# Patient Record
Sex: FEMALE | Race: OTHER | URBAN - METROPOLITAN AREA
[De-identification: names, ages, dates, MRNs, and addresses within clinical notes are randomized per-mention and may not be internally consistent; named-entity substitution may affect disease eponyms.]

---

## 2022-03-11 ENCOUNTER — TELEMEDICINE (OUTPATIENT)
Dept: TELEHEALTH | Age: 32
End: 2022-03-11
Payer: MEDICAID

## 2022-03-11 DIAGNOSIS — J06.9 UPPER RESPIRATORY TRACT INFECTION, UNSPECIFIED TYPE: Primary | ICD-10-CM

## 2022-03-11 PROCEDURE — 99202 OFFICE O/P NEW SF 15 MIN: CPT | Performed by: PHYSICIAN ASSISTANT

## 2022-03-11 NOTE — PROGRESS NOTES
Virtual/Telephone Check-In    Jesus Henry verbally consents to a Virtual/Telephone Check-In service on 03/11/22. Patient has been referred to the [facility] website at [email or web address] to review the yearly Consent to Treat document. Patient understands and accepts financial responsibility for any deductible, co-insurance and/or co-pays associated with this service. Telehealth Verbal Consent   I conducted a telehealth visit with Jesus Henry today, 03/11/22, which was completed using two-way, real-time interactive audio and video communication. This has been done in good lynette to provide continuity of care in the best interest of the provider-patient relationship, due to the COVID -19 public health crisis/national emergency where restrictions of face-to-face office visits are ongoing. Every conscious effort was taken to allow for sufficient and adequate time to complete the visit. The patient was made aware of the limitations of the telehealth visit, including treatment limitations as no physical exam could be performed. The patient was advised to call 911 or to go to the ER in case there was an emergency. The patient was also advised of the potential privacy & security concerns related to the telehealth platform. The patient was made aware of where to find [facility] notice of privacy practices, telehealth consent form and other related consent forms and documents. which are located on the [facility] website. The patient verbally agreed to telehealth consent form, related consents and the risks discussed. Lastly, the patient confirmed that they were in [place]. Included in this visit, time may have been spent reviewing labs, medications, radiology tests and decision making. Appropriate medical decision-making and tests are ordered as detailed in the plan of care above. Coding/billing information is submitted for this visit based on complexity of care and/or time spent for the visit.     CHIEF COMPLAINT: Patient presents with:  Cold      HPI:   Deborah Chowdary is a 27year old female who presents for a video visit. Patient reports cough, nasal congestion for 2 days. Had sore throat initially but resolved  Patient denies headache, fever, chills, body aches, SOB, wheezing, chest pain. Patient has tried Tylenol, Advil and loratadine for symptoms, which has seemed to help. Patient has not been exposed to [employer]. Pt is vaccinated for covid. No current outpatient medications on file. No past medical history on file. No past surgical history on file. Social History    Tobacco Use      Smoking status: Not on file      Smokeless tobacco: Not on file    Alcohol use: Not on file    Drug use: Not on file        REVIEW OF SYSTEMS:   GENERAL: normal appetite  SKIN: no rashes or abnormal skin lesions  HEENT: See HPI  LUNGS: denies shortness of breath or wheezing, See HPI  CARDIOVASCULAR: denies chest pain or palpitations   GI: denies N/V/C or abdominal pain  NEURO: Denies headaches    EXAM:   General: Alert, Well-appearing and In no acute distress  Respiratory:   Speaking in full sentences comfortably  Normal work of breathing  No cough during visit  Head: Normocephalic  Nose: No obvious nasal discharge. Skin: No obvious rashes or lesions from what observed. No results found for this or any previous visit (from the past 24 hour(s)). ASSESSMENT AND PLAN:   Deborah Chowdary is a 27year old female who presents with symptoms that are consistent with    ASSESSMENT:   Upper respiratory tract infection, unspecified type  (primary encounter diagnosis)    PLAN: Discussed viral vs bacterial etiology of URIs, including bronchitis and sinus congestion/pain. Patient was informed that antibiotics are not effective for treating viral ailments and can result in antibiotic resistence. Reviewed symptom relief measures with patient.  Patient verbalized understanding of symptom relief measures, as well as s/sx of worsening that would need further evaluation. Declines covid testing. See patient Instructions    Meds & Refills for this Visit:  Requested Prescriptions      No prescriptions requested or ordered in this encounter       Risks, benefits, and side effects of medication explained and discussed. Patient Instructions   I would recommend the following supplements:  Vitamin C (Emergen-C packets) 1 packet twice daily, can do more if you would like  Zicam tablets (zinc): use as directed on package  Sambucol elderberry Syrup: 10 mL every 4-6 hours while you have symptoms  OTC cough and cold medications you can take: Dayquil, Nyquil, Mary Lou seltzer which have combinations of antihistamine, cough suppressant, nasal decongestant and acetaminophen     RX for Recovery from Your Viral Upper Respiratory Illness  Prepared for: Jennifer Beltran  Date: 3/11/2022    Take the medications(s) and/or action(s) checked below to help you feel better. Note: Generic drug names are given first, followed by examples of brand names. For fever and/or pain (sore throat, body aches, sinus pain, headache)      Ibuprofen (Advil, Motrin): Take 2-3 tablets every 6 to 8 hrs as needed for pain and inflammation. Naproxen (Aleve): Take 1 tablet every 12 hours for as needed for pain and inflammation. Acetaminophen (Tylenol, Panadol): Take 1 tablet every 4 hours as needed for pain if allergic to Advil/Aleve products. For sore throat:      Throat lozenges or sprays containing benzocaine, phenol, menthol. Gargle with warm salt water. CHILDREN'S IBUPROFEN, 4 tsp (1 capful, which is 400 mg, a usual adult dose) every 4-6 hrs. This works a bit more immediately for your pain by it's anti-inflammatory effect directly in the throat first.    For cough and congestion:    Dextromethorphan/guaifenesin (Mucinex DM): Take with plenty of water for the next 5-7 days:]  Two tablets every 12 hours for the first four doses, then one tablet every 12 hours.   Warm mist vaporizer with camphor (Vicks VapoSteam). For nose and sinus congestion:   Pseudoephedrine (Sudafed 4- or 12-hour tablets, ask for it at the pharmacy counter). Take for the next 3-5; follow the package directions. Phenylephrine (Sudafed PE - nondrowsy): Take for the next 3-5 days; follow the package directions. Saline Nasal spray:  Follow the package directions to help clear your nose and make it feel less dry. For sneezing, watery/itchy eyes, runny nose:   Chlorpheniramine (Chlor-Trimeton, Coricidin HBP): Take for the next 3-5 days; follow the package directions especially if you have high blood pressure. Avoid Sudafed products if you have high blood pressure. For overall congestion relief:  Drink more liquids (water, juice, chicken soup, tea). These help to thin the mucus in your nose and chest so you'll feel less stuffy. Chicken soup is a demonstrated anti-inflammatory and has been shown in the literature to think the mucous. Use a cool mist humidifier at your bedside. Moist air is more comfortable and less irritating than dry air and will make it easier for your to sleep. Take a hot shower or inhale steam from another source (bow of hot water). The steam will help you breath more easily. For faster recovery:  REST! Take a break from your usual activities for the next few days. To avoid spreading your illness:  Stay home from work or school if you have fever, or symptoms below the neck (significant cough, stomach ache/nausea, diarrhea). You are likely to be shedding the most viral particles during this time. Do not return to work or school until [employer] been fever-free for a full 24 hours WITHOUT ANALGESICS (aspirin, tylenol, ibuprofen, aleve)    Try not to touch your eyes, nose and mouth. Wash your hands before touching anything or anyone else. Don't let anyone use a dish or utensil you've used until it's been thoroughly washed with hot soapy water.             The patient indicates understanding of these issues and agrees to the plan. The patient is asked to follow up with PCP or create another telehealth encounter if sx's persist or worsen. Yasmine Coronado advised to follow CDC guidelines for self isolation and symptomatic treatment as outlined on CDC Patient Guidelines. Yasmine Coronado understands video visit evaluation is not a substitute for face-to-face examination or emergency care. Patient advised to go to ER or call 911 for worsening symptoms or acute distress.

## 2022-03-11 NOTE — PATIENT INSTRUCTIONS
I would recommend the following supplements:  Vitamin C (Emergen-C packets) 1 packet twice daily, can do more if you would like  Zicam tablets (zinc): use as directed on package  Sambucol elderberry Syrup: 10 mL every 4-6 hours while you have symptoms  OTC cough and cold medications you can take: Dayquil, Nyquil, Mary Lou seltzer which have combinations of antihistamine, cough suppressant, nasal decongestant and acetaminophen     RX for Recovery from Your Viral Upper Respiratory Illness  Prepared for: Tiny Rodas  Date: 3/11/2022    Take the medications(s) and/or action(s) checked below to help you feel better. Note: Generic drug names are given first, followed by examples of brand names. For fever and/or pain (sore throat, body aches, sinus pain, headache)      Ibuprofen (Advil, Motrin): Take 2-3 tablets every 6 to 8 hrs as needed for pain and inflammation. Naproxen (Aleve): Take 1 tablet every 12 hours for as needed for pain and inflammation. Acetaminophen (Tylenol, Panadol): Take 1 tablet every 4 hours as needed for pain if allergic to Advil/Aleve products. For sore throat:      Throat lozenges or sprays containing benzocaine, phenol, menthol. Gargle with warm salt water. CHILDREN'S IBUPROFEN, 4 tsp (1 capful, which is 400 mg, a usual adult dose) every 4-6 hrs. This works a bit more immediately for your pain by it's anti-inflammatory effect directly in the throat first.    For cough and congestion:    Dextromethorphan/guaifenesin (Mucinex DM): Take with plenty of water for the next 5-7 days:]  Two tablets every 12 hours for the first four doses, then one tablet every 12 hours. Warm mist vaporizer with camphor (Vicks VapoSteam). For nose and sinus congestion:   Pseudoephedrine (Sudafed 4- or 12-hour tablets, ask for it at the pharmacy counter). Take for the next 3-5; follow the package directions. Phenylephrine (Sudafed PE - nondrowsy):   Take for the next 3-5 days; follow the package directions. Saline Nasal spray:  Follow the package directions to help clear your nose and make it feel less dry. For sneezing, watery/itchy eyes, runny nose:   Chlorpheniramine (Chlor-Trimeton, Coricidin HBP): Take for the next 3-5 days; follow the package directions especially if you have high blood pressure. Avoid Sudafed products if you have high blood pressure. For overall congestion relief:  Drink more liquids (water, juice, chicken soup, tea). These help to thin the mucus in your nose and chest so you'll feel less stuffy. Chicken soup is a demonstrated anti-inflammatory and has been shown in the literature to think the mucous. Use a cool mist humidifier at your bedside. Moist air is more comfortable and less irritating than dry air and will make it easier for your to sleep. Take a hot shower or inhale steam from another source (bow of hot water). The steam will help you breath more easily. For faster recovery:  REST! Take a break from your usual activities for the next few days. To avoid spreading your illness:  Stay home from work or school if you have fever, or symptoms below the neck (significant cough, stomach ache/nausea, diarrhea). You are likely to be shedding the most viral particles during this time. Do not return to work or school until [employer] been fever-free for a full 24 hours WITHOUT ANALGESICS (aspirin, tylenol, ibuprofen, aleve)    Try not to touch your eyes, nose and mouth. Wash your hands before touching anything or anyone else. Don't let anyone use a dish or utensil you've used until it's been thoroughly washed with hot soapy water.

## 2024-03-01 ENCOUNTER — TELEPHONE (OUTPATIENT)
Dept: NEUROLOGY | Facility: CLINIC | Age: 34
End: 2024-03-01

## (undated) NOTE — LETTER
Date: 3/11/2022    Patient Name: Angel Jean          To Whom it may concern: This letter has been written at the patient's request. The above patient was seen at the [facility] for treatment of a medical condition. This patient should be excused from attending work/school Friday 3/11/22. The patient may return to work/school on Monday 3/14/22 with no limitations.         Sincerely,    Quince Gowers, MMS, PA-C  Physician Assistant  [address] [facility]